# Patient Record
Sex: FEMALE | Race: OTHER | URBAN - METROPOLITAN AREA
[De-identification: names, ages, dates, MRNs, and addresses within clinical notes are randomized per-mention and may not be internally consistent; named-entity substitution may affect disease eponyms.]

---

## 2023-07-07 ENCOUNTER — OFFICE VISIT (OUTPATIENT)
Dept: URBAN - METROPOLITAN AREA CLINIC 30 | Facility: CLINIC | Age: 53
End: 2023-07-07
Payer: OTHER GOVERNMENT

## 2023-07-07 DIAGNOSIS — H16.223 KERATOCONJUNCTIVITIS SICCA, BILATERAL: ICD-10-CM

## 2023-07-07 DIAGNOSIS — E11.9 DIABETES MELLITUS TYPE 2 WITHOUT MENTION OF COMPLICATION: Primary | ICD-10-CM

## 2023-07-07 DIAGNOSIS — H26.493 OTHER SECONDARY CATARACT, BILATERAL: ICD-10-CM

## 2023-07-07 DIAGNOSIS — H43.393 OTHER VITREOUS OPACITIES, BILATERAL: ICD-10-CM

## 2023-07-07 DIAGNOSIS — I69.398 OTHER SEQUELAE OF CEREBRAL INFARCTION: ICD-10-CM

## 2023-07-07 DIAGNOSIS — G51.0 BELLS PALSY: ICD-10-CM

## 2023-07-07 DIAGNOSIS — R51.9 HEADACHE, UNSPECIFIED: ICD-10-CM

## 2023-07-07 DIAGNOSIS — Z79.84 LONG TERM (CURRENT) USE OF ORAL ANTIDIABETIC DRUGS: ICD-10-CM

## 2023-07-07 PROCEDURE — 92134 CPTRZ OPH DX IMG PST SGM RTA: CPT | Performed by: OPTOMETRIST

## 2023-07-07 PROCEDURE — 92004 COMPRE OPH EXAM NEW PT 1/>: CPT | Performed by: OPTOMETRIST

## 2023-07-07 ASSESSMENT — INTRAOCULAR PRESSURE
OD: 16
OS: 17

## 2023-07-07 ASSESSMENT — VISUAL ACUITY
OD: 20/20
OS: 20/20

## 2023-07-07 ASSESSMENT — KERATOMETRY
OS: 44.38
OD: 44.50

## 2023-07-07 NOTE — IMPRESSION/PLAN
Impression: Other sequelae of cerebral infarction: I69.398. Plan: Feb 2023. OD VA possibly affected. Constricted CF. Consider VF in near future to confirm.

## 2023-07-07 NOTE — IMPRESSION/PLAN
Impression: [place] palsy: G51.0. Plan: Also Sophie Mckenna. Pt states onset was at time of stroke. Discussed patching OD to reduce exposure. Also discussed sleep mask and sleep rite tape.

## 2023-07-07 NOTE — IMPRESSION/PLAN
Impression: Keratoconjunctivitis sicca, bilateral: H18.208. Plan: Tearing OS>OD. Gave DE sheet. Consider plugs. PLAN:  AT's qid OU. Recommend O3's. Avoid ceiling fans. Recommend night time gel. Recommend sleep mask d/t dorcas OD.

## 2023-07-07 NOTE — IMPRESSION/PLAN
Impression: Other secondary cataract, bilateral: H26.493. Plan: OD>OS. Opacified capsule with option for YAG laser capsulotomy. Discussed procedure, risks, benefits and side effects.

## 2023-07-07 NOTE — IMPRESSION/PLAN
Impression: Diabetes mellitus Type 2 without mention of complication: T39.9. Plan: No diabetic retinopathy OU. No Diabetic Macular Edema noted OU. Recommend yearly diabetic eye exam. Emphasized importance of strict BS control, diet, exercise, and BP control to avoid risk of loss of vision. Recommend regular, ongoing follow-ups with their PCP to monitor DM as well. Most recent A1C  9.0, per pt.

## 2024-10-04 ENCOUNTER — OFFICE VISIT (OUTPATIENT)
Dept: URBAN - METROPOLITAN AREA CLINIC 41 | Facility: CLINIC | Age: 54
End: 2024-10-04
Payer: OTHER GOVERNMENT

## 2024-10-04 DIAGNOSIS — Z96.1 PRESENCE OF INTRAOCULAR LENS: ICD-10-CM

## 2024-10-04 DIAGNOSIS — H53.40 VISUAL FIELD DEFECTS: Primary | ICD-10-CM

## 2024-10-04 DIAGNOSIS — E11.3293 TYPE 2 DIAB WITH MILD NONP RTNOP WITHOUT MACULAR EDEMA, BI: ICD-10-CM

## 2024-10-04 PROCEDURE — 92134 CPTRZ OPH DX IMG PST SGM RTA: CPT | Performed by: OPHTHALMOLOGY

## 2024-10-04 PROCEDURE — 92004 COMPRE OPH EXAM NEW PT 1/>: CPT | Performed by: OPHTHALMOLOGY

## 2024-10-04 PROCEDURE — 92235 FLUORESCEIN ANGRPH MLTIFRAME: CPT | Performed by: OPHTHALMOLOGY

## 2024-10-04 ASSESSMENT — INTRAOCULAR PRESSURE
OD: 20
OS: 21